# Patient Record
Sex: MALE | Race: WHITE | NOT HISPANIC OR LATINO | URBAN - METROPOLITAN AREA
[De-identification: names, ages, dates, MRNs, and addresses within clinical notes are randomized per-mention and may not be internally consistent; named-entity substitution may affect disease eponyms.]

---

## 2023-03-31 LAB
CHOLESTEROL (MG/DL) IN SER/PLAS: 245 MG/DL (ref 0–199)
CHOLESTEROL IN HDL (MG/DL) IN SER/PLAS: 57.3 MG/DL
CHOLESTEROL/HDL RATIO: 4.3
LDL: 151 MG/DL (ref 0–99)
TRIGLYCERIDE (MG/DL) IN SER/PLAS: 182 MG/DL (ref 0–149)
VLDL: 36 MG/DL (ref 0–40)

## 2023-04-03 NOTE — RESULT ENCOUNTER NOTE
Please let Fabien Stewart know that cholesterol has not changed and is still a candidate for a statin.  We will be reviewed at your follow up scheduled

## 2023-04-10 ENCOUNTER — OFFICE VISIT (OUTPATIENT)
Dept: PRIMARY CARE | Facility: CLINIC | Age: 58
End: 2023-04-10
Payer: COMMERCIAL

## 2023-04-10 VITALS
HEIGHT: 67 IN | DIASTOLIC BLOOD PRESSURE: 72 MMHG | WEIGHT: 170.2 LBS | TEMPERATURE: 98.2 F | BODY MASS INDEX: 26.71 KG/M2 | OXYGEN SATURATION: 98 % | RESPIRATION RATE: 16 BRPM | SYSTOLIC BLOOD PRESSURE: 110 MMHG | HEART RATE: 80 BPM

## 2023-04-10 DIAGNOSIS — E78.1 HYPERTRIGLYCERIDEMIA: ICD-10-CM

## 2023-04-10 DIAGNOSIS — R07.9 CHEST PAIN, UNSPECIFIED TYPE: Primary | ICD-10-CM

## 2023-04-10 DIAGNOSIS — E78.2 MIXED HYPERLIPIDEMIA: ICD-10-CM

## 2023-04-10 DIAGNOSIS — A63.0 CONDYLOMA ACUMINATA: ICD-10-CM

## 2023-04-10 PROBLEM — K21.9 CHEST PAIN DUE TO GERD: Status: ACTIVE | Noted: 2023-04-10

## 2023-04-10 PROBLEM — R07.89 OTHER CHEST PAIN: Status: ACTIVE | Noted: 2023-04-10

## 2023-04-10 PROBLEM — M25.512 PAIN IN LEFT SHOULDER: Status: ACTIVE | Noted: 2023-04-10

## 2023-04-10 PROBLEM — R22.2 ABDOMINAL WALL LUMP: Status: ACTIVE | Noted: 2023-04-10

## 2023-04-10 PROBLEM — R53.83 FATIGUE: Status: ACTIVE | Noted: 2023-04-10

## 2023-04-10 PROBLEM — E55.9 VITAMIN D DEFICIENCY: Status: ACTIVE | Noted: 2023-04-10

## 2023-04-10 PROBLEM — R17 ELEVATED BILIRUBIN: Status: ACTIVE | Noted: 2023-04-10

## 2023-04-10 PROBLEM — D17.1 LIPOMA OF BACK: Status: ACTIVE | Noted: 2023-04-10

## 2023-04-10 PROCEDURE — 93000 ELECTROCARDIOGRAM COMPLETE: CPT | Performed by: INTERNAL MEDICINE

## 2023-04-10 PROCEDURE — 1036F TOBACCO NON-USER: CPT | Performed by: INTERNAL MEDICINE

## 2023-04-10 PROCEDURE — 99214 OFFICE O/P EST MOD 30 MIN: CPT | Performed by: INTERNAL MEDICINE

## 2023-04-10 RX ORDER — ATORVASTATIN CALCIUM 10 MG/1
10 TABLET, FILM COATED ORAL DAILY
Qty: 30 TABLET | Refills: 5 | Status: SHIPPED | OUTPATIENT
Start: 2023-04-10 | End: 2023-11-15 | Stop reason: SDUPTHER

## 2023-04-10 RX ORDER — IMIQUIMOD 12.5 MG/.25G
1 CREAM TOPICAL 3 TIMES WEEKLY
Qty: 12 PACKET | Refills: 2 | Status: SHIPPED | OUTPATIENT
Start: 2023-04-10 | End: 2023-05-10

## 2023-04-10 ASSESSMENT — ENCOUNTER SYMPTOMS
VOMITING: 0
SHORTNESS OF BREATH: 0
CHEST TIGHTNESS: 0
UNEXPECTED WEIGHT CHANGE: 0
POLYDIPSIA: 0
FREQUENCY: 0
FEVER: 0
DIARRHEA: 0
NAUSEA: 0
PALPITATIONS: 0
HEADACHES: 0
CONSTIPATION: 0
COUGH: 0
NECK PAIN: 0
DYSPHORIC MOOD: 0
ARTHRALGIAS: 0
DIZZINESS: 0

## 2023-04-10 ASSESSMENT — PAIN SCALES - GENERAL: PAINLEVEL: 0-NO PAIN

## 2023-04-10 NOTE — PROGRESS NOTES
"Subjective   Fabien Stewart is a 57 y.o. male who presents for Follow-up (Review lab).    Patient has no concerns today     Patient has some skin looked at that is on upper leg- the area is bumpy and a little red.   Has had some lesions in the genital area noted recently.    He experienced an episode of Chest Pain last week while eating chinese food.  Had pressure below the rib cage both sides and it lasted until the next day.    Was doing sit-ups and stopped since then.  Did not go away until the next day after eating breakfast.    No change with food   Exercise walks 6 miles in the morning. No pain or SOB with exercise.              Review of Systems   Constitutional:  Negative for fever and unexpected weight change.   HENT:  Negative for congestion and ear pain.    Eyes:  Negative for visual disturbance.   Respiratory:  Negative for cough, chest tightness and shortness of breath.    Cardiovascular:  Negative for chest pain, palpitations and leg swelling.   Gastrointestinal:  Negative for constipation, diarrhea, nausea and vomiting.   Endocrine: Negative for polydipsia and polyuria.   Genitourinary:  Negative for frequency and urgency.   Musculoskeletal:  Negative for arthralgias and neck pain.   Skin:  Negative for rash.   Neurological:  Negative for dizziness and headaches.   Psychiatric/Behavioral:  Negative for dysphoric mood.    All other systems reviewed and are negative.      Objective   /72   Pulse 80   Temp 36.8 °C (98.2 °F)   Resp 16   Ht 1.702 m (5' 7\")   Wt 77.2 kg (170 lb 3.2 oz)   SpO2 98%   BMI 26.66 kg/m²       Physical Exam  Constitutional:       Appearance: Normal appearance.   HENT:      Head: Normocephalic.   Eyes:      Conjunctiva/sclera: Conjunctivae normal.   Cardiovascular:      Rate and Rhythm: Normal rate and regular rhythm.   Pulmonary:      Effort: Pulmonary effort is normal.      Breath sounds: Normal breath sounds.   Abdominal:      General: Abdomen is flat.      " Palpations: Abdomen is soft.   Genitourinary:     Comments: Areas of flat warts at the base of the penile shaft  Musculoskeletal:      Cervical back: Neck supple.   Lymphadenopathy:      Lower Body: No right inguinal adenopathy. No left inguinal adenopathy.   Skin:     General: Skin is warm and dry.   Neurological:      Mental Status: He is alert.         Assessment/Plan   Problem List Items Addressed This Visit          Infectious/Inflammatory    Condyloma acuminata    Relevant Medications    imiquimod (Aldara) 5 % cream    Other Relevant Orders    Follow Up In [facility] - PCP       Other    Hypertriglyceridemia    Relevant Medications    atorvastatin (Lipitor) 10 mg tablet    Other Relevant Orders    Follow Up In [facility] - PCP    Chest pain, atypical - Primary     Chest pain which is non exertional and along rib cage border.  EKG unremarkable and CT Calcium score was 7.    Will call if pain returns or he develops CP, SOB, BIGGS.           Relevant Medications    atorvastatin (Lipitor) 10 mg tablet     Other Visit Diagnoses       Mixed hyperlipidemia        Relevant Medications    atorvastatin (Lipitor) 10 mg tablet          Encounter Diagnoses   Name Primary?    Chest pain, unspecified type Yes    Condyloma acuminata     Hypertriglyceridemia     Mixed hyperlipidemia      Cameron Alston, DO

## 2023-04-11 DIAGNOSIS — R07.9 CHEST PAIN, UNSPECIFIED TYPE: ICD-10-CM

## 2023-04-20 PROBLEM — M25.512 PAIN IN LEFT SHOULDER: Status: RESOLVED | Noted: 2023-04-10 | Resolved: 2023-04-20

## 2023-04-20 NOTE — ASSESSMENT & PLAN NOTE
Chest pain which is non exertional and along rib cage border.  EKG unremarkable and CT Calcium score was 7.    Will call if pain returns or he develops CP, SOB, BIGGS.

## 2023-05-10 ENCOUNTER — APPOINTMENT (OUTPATIENT)
Dept: PRIMARY CARE | Facility: CLINIC | Age: 58
End: 2023-05-10
Payer: COMMERCIAL

## 2023-05-17 ENCOUNTER — OFFICE VISIT (OUTPATIENT)
Dept: PRIMARY CARE | Facility: CLINIC | Age: 58
End: 2023-05-17
Payer: COMMERCIAL

## 2023-05-17 VITALS
OXYGEN SATURATION: 98 % | BODY MASS INDEX: 27.37 KG/M2 | RESPIRATION RATE: 16 BRPM | WEIGHT: 174.4 LBS | DIASTOLIC BLOOD PRESSURE: 70 MMHG | HEIGHT: 67 IN | TEMPERATURE: 98 F | HEART RATE: 77 BPM | SYSTOLIC BLOOD PRESSURE: 112 MMHG

## 2023-05-17 DIAGNOSIS — R07.9 CHEST PAIN, UNSPECIFIED TYPE: ICD-10-CM

## 2023-05-17 DIAGNOSIS — E78.1 HYPERTRIGLYCERIDEMIA: ICD-10-CM

## 2023-05-17 DIAGNOSIS — A63.0 CONDYLOMA ACUMINATA: ICD-10-CM

## 2023-05-17 DIAGNOSIS — R59.9 REACTIVE LYMPHADENOPATHY: ICD-10-CM

## 2023-05-17 DIAGNOSIS — L03.031 PARONYCHIA OF THIRD TOE OF RIGHT FOOT: Primary | ICD-10-CM

## 2023-05-17 PROCEDURE — 99213 OFFICE O/P EST LOW 20 MIN: CPT | Performed by: INTERNAL MEDICINE

## 2023-05-17 PROCEDURE — 1036F TOBACCO NON-USER: CPT | Performed by: INTERNAL MEDICINE

## 2023-05-17 RX ORDER — CEPHALEXIN 500 MG/1
500 CAPSULE ORAL 3 TIMES DAILY
Qty: 21 CAPSULE | Refills: 0 | Status: SHIPPED | OUTPATIENT
Start: 2023-05-17 | End: 2023-05-24

## 2023-05-17 ASSESSMENT — PAIN SCALES - GENERAL: PAINLEVEL: 0-NO PAIN

## 2023-05-17 NOTE — ASSESSMENT & PLAN NOTE
Advised to cut nails straight across and shorter to lessen trauma while hiking.    History suggests subungual hematoma and likely will lose nail.  Treat infection and follow up in two weeks to recheck lymph nodes.

## 2023-05-17 NOTE — PROGRESS NOTES
"Subjective   Fabien Stewart is a 57 y.o. male who presents for Follow-up and Groin Swelling (Swelling of lymph node on right upper side an infection on right 3rd toe ).      Patient states that he went hiking at end of April, his toe at that time was black and blue then about 5 days ago he noticed right 3rd toe had swelled up, turned red and was painful, he soaked it in Epson salt for the last few days- the pain has went away but still red and swollen   Upper right leg lymph node is swollen x 2-3 days there is pain when pushed on but no pain during walking         Review of Systems   All other systems reviewed and are negative.      Objective   /70   Pulse 77   Temp 36.7 °C (98 °F)   Resp 16   Ht 1.702 m (5' 7\")   Wt 79.1 kg (174 lb 6.4 oz)   SpO2 98%   BMI 27.31 kg/m²       Physical Exam  Constitutional:       General: He is not in acute distress.     Appearance: Normal appearance. He is not toxic-appearing.   Musculoskeletal:      Comments: Two lymph nodes, right inguinal, tender.  Right third toe with mild proximal nail bed erythema, non tender. Small area at base, dried blood and no drainage visible.  Nail bed pale and non adherent to nail bed, consistent with previous hematoma.  No discoloration or ecchymosis at this time.   Lymphadenopathy:      Lower Body: Right inguinal adenopathy present.   Neurological:      Mental Status: He is alert.         Assessment/Plan   Problem List Items Addressed This Visit          Musculoskeletal    Paronychia of third toe of right foot - Primary     Advised to cut nails straight across and shorter to lessen trauma while hiking.    History suggests subungual hematoma and likely will lose nail.  Treat infection and follow up in two weeks to recheck lymph nodes.           Relevant Medications    cephalexin (Keflex) 500 mg capsule       Infectious/Inflammatory    Condyloma acuminata       Immune    Reactive lymphadenopathy     Likely secondary to paronychia on right " lower extremity.  Discussed need to follow up for resolution in two weeks.            Other    Hypertriglyceridemia     Other Visit Diagnoses       Chest pain, unspecified type              Encounter Diagnoses   Name Primary?    Chest pain, unspecified type     Condyloma acuminata     Hypertriglyceridemia     Paronychia of third toe of right foot Yes    Reactive lymphadenopathy      Cameron Alston, DO

## 2023-05-17 NOTE — ASSESSMENT & PLAN NOTE
Likely secondary to paronychia on right lower extremity.  Discussed need to follow up for resolution in two weeks.

## 2023-05-18 ENCOUNTER — APPOINTMENT (OUTPATIENT)
Dept: PRIMARY CARE | Facility: CLINIC | Age: 58
End: 2023-05-18
Payer: COMMERCIAL

## 2023-06-08 ENCOUNTER — APPOINTMENT (OUTPATIENT)
Dept: PRIMARY CARE | Facility: CLINIC | Age: 58
End: 2023-06-08
Payer: COMMERCIAL

## 2023-07-05 ENCOUNTER — OFFICE VISIT (OUTPATIENT)
Dept: PRIMARY CARE | Facility: CLINIC | Age: 58
End: 2023-07-05
Payer: COMMERCIAL

## 2023-07-05 VITALS
DIASTOLIC BLOOD PRESSURE: 78 MMHG | RESPIRATION RATE: 16 BRPM | SYSTOLIC BLOOD PRESSURE: 123 MMHG | HEART RATE: 86 BPM | BODY MASS INDEX: 27.84 KG/M2 | HEIGHT: 67 IN | OXYGEN SATURATION: 96 % | TEMPERATURE: 98 F | WEIGHT: 177.4 LBS

## 2023-07-05 DIAGNOSIS — R59.1 LYMPHADENOPATHY: Primary | ICD-10-CM

## 2023-07-05 DIAGNOSIS — M79.622 LEFT AXILLARY PAIN: ICD-10-CM

## 2023-07-05 DIAGNOSIS — R07.89 OTHER CHEST PAIN: ICD-10-CM

## 2023-07-05 PROCEDURE — 99214 OFFICE O/P EST MOD 30 MIN: CPT | Performed by: INTERNAL MEDICINE

## 2023-07-05 PROCEDURE — 1036F TOBACCO NON-USER: CPT | Performed by: INTERNAL MEDICINE

## 2023-07-05 ASSESSMENT — ENCOUNTER SYMPTOMS: TINGLING: 1

## 2023-07-05 ASSESSMENT — PAIN SCALES - GENERAL: PAINLEVEL: 2

## 2023-07-05 NOTE — PROGRESS NOTES
"Subjective   Fabien Stewart is a 57 y.o. male who presents for Follow-up (Right 3rd toe and and swelling of lymph node in groin area ).      Patient states that his right 3rd toe is a healed and groin lymph node is also better    Patients states that the last 2 weeks he has has a deep pain in his left shoulder blade, then had some tingling that shot up into neck and across chest- went to the ER Monday and they did an EKG, labs and xray and they ruled out blood clots   Feels like a \"Headache in my armpit\"   Noted some pain like when he had fatty tissue removed but he wasn't sure.  Was going to wait then it got worse and across the chest and went to the ER.  Has a vacation planned for tomorrow and wanted to be sure.    Patient can push on the area of pressure/pain but does not make the pain any worse.    Comes and goes.  Thinks it might be his lymph nodes but not sure.      Travelling to [facility]     Shoulder Pain   The pain is present in the neck and left shoulder. This is a recurrent problem. The current episode started more than 1 year ago. There has been no history of extremity trauma. The problem occurs daily. The problem has been unchanged. The quality of the pain is described as aching and dull. The pain is at a severity of 1/10. The pain is mild. Associated symptoms include tingling. He has tried nothing for the symptoms.       Review of Systems   Neurological:  Positive for tingling.   All other systems reviewed and are negative.      Objective   /78   Pulse 86   Temp 36.7 °C (98 °F)   Resp 16   Ht 1.702 m (5' 7\")   Wt 80.5 kg (177 lb 6.4 oz)   SpO2 96%   BMI 27.78 kg/m²       Physical Exam  Constitutional:       Appearance: Normal appearance.   HENT:      Head: Normocephalic.   Eyes:      Conjunctiva/sclera: Conjunctivae normal.   Cardiovascular:      Rate and Rhythm: Normal rate and regular rhythm.   Pulmonary:      Effort: Pulmonary effort is normal.      Breath sounds: Normal breath sounds. "   Musculoskeletal:         General: No swelling. Normal range of motion.      Right upper arm: No swelling, edema, deformity or tenderness.      Left upper arm: No swelling, edema, deformity or tenderness.      Cervical back: Neck supple.   Skin:     General: Skin is warm and dry.   Neurological:      Mental Status: He is alert.         Assessment/Plan   Problem List Items Addressed This Visit       Left axillary pain    Relevant Orders    CT chest wo IV contrast    Other chest pain    Relevant Orders    CT chest wo IV contrast    Lymphadenopathy - Primary    Relevant Orders    CT chest wo IV contrast     Encounter Diagnoses   Name Primary?    Lymphadenopathy Yes    Other chest pain     Left axillary pain      Cameron Alston, DO

## 2023-08-16 ENCOUNTER — OFFICE VISIT (OUTPATIENT)
Dept: PRIMARY CARE | Facility: CLINIC | Age: 58
End: 2023-08-16
Payer: COMMERCIAL

## 2023-08-16 VITALS
OXYGEN SATURATION: 97 % | HEIGHT: 67 IN | RESPIRATION RATE: 16 BRPM | DIASTOLIC BLOOD PRESSURE: 88 MMHG | WEIGHT: 175.8 LBS | HEART RATE: 76 BPM | BODY MASS INDEX: 27.59 KG/M2 | TEMPERATURE: 98.2 F | SYSTOLIC BLOOD PRESSURE: 128 MMHG

## 2023-08-16 DIAGNOSIS — M25.512 CHRONIC LEFT SHOULDER PAIN: ICD-10-CM

## 2023-08-16 DIAGNOSIS — M79.622 LEFT AXILLARY PAIN: Primary | ICD-10-CM

## 2023-08-16 DIAGNOSIS — G89.29 CHRONIC LEFT SHOULDER PAIN: ICD-10-CM

## 2023-08-16 DIAGNOSIS — K76.0 FATTY LIVER: ICD-10-CM

## 2023-08-16 PROCEDURE — 1036F TOBACCO NON-USER: CPT | Performed by: INTERNAL MEDICINE

## 2023-08-16 PROCEDURE — 99213 OFFICE O/P EST LOW 20 MIN: CPT | Performed by: INTERNAL MEDICINE

## 2023-08-16 ASSESSMENT — PAIN SCALES - GENERAL: PAINLEVEL: 1

## 2023-08-16 NOTE — PROGRESS NOTES
"Subjective   Fabien Stewart is a 57 y.o. male who presents for follow up on chest xray and CT  Patient states that the shoulder discomfort has gotten a little better, states that when he talks about it, it will start hurting worse    No new concerns today       Shoulder flared up and went to ER prior to vacation.    Heart was fine.    Pain only bothers him when he thinks about it.          Review of Systems    Objective   /88   Pulse 76   Temp 36.8 °C (98.2 °F)   Resp 16   Ht 1.702 m (5' 7\")   Wt 79.7 kg (175 lb 12.8 oz)   SpO2 97%   BMI 27.53 kg/m²       Physical Exam  Constitutional:       Appearance: Normal appearance.   HENT:      Head: Normocephalic.   Cardiovascular:      Rate and Rhythm: Normal rate.   Pulmonary:      Effort: Pulmonary effort is normal.   Musculoskeletal:      Left shoulder: No swelling or tenderness. Normal range of motion.      Cervical back: Neck supple.      Right lower leg: No edema.      Left lower leg: No edema.   Skin:     General: Skin is warm and dry.   Psychiatric:         Mood and Affect: Mood normal.         Assessment/Plan   Problem List Items Addressed This Visit       Left axillary pain - Primary    Relevant Orders    Referral to Physical Therapy    XR shoulder left 2+ views     Other Visit Diagnoses       Fatty liver        Relevant Orders    US abdomen limited liver    Chronic left shoulder pain        Relevant Orders    Referral to Physical Therapy    XR shoulder left 2+ views          Encounter Diagnoses   Name Primary?    Left axillary pain Yes    Fatty liver     Chronic left shoulder pain      Cameron Alston, DO   "

## 2023-11-15 ENCOUNTER — OFFICE VISIT (OUTPATIENT)
Dept: PRIMARY CARE | Facility: CLINIC | Age: 58
End: 2023-11-15
Payer: COMMERCIAL

## 2023-11-15 VITALS
SYSTOLIC BLOOD PRESSURE: 120 MMHG | HEIGHT: 67 IN | DIASTOLIC BLOOD PRESSURE: 74 MMHG | OXYGEN SATURATION: 98 % | HEART RATE: 76 BPM | RESPIRATION RATE: 16 BRPM | TEMPERATURE: 97.6 F | WEIGHT: 183.8 LBS | BODY MASS INDEX: 28.85 KG/M2

## 2023-11-15 DIAGNOSIS — E78.2 MIXED HYPERLIPIDEMIA: ICD-10-CM

## 2023-11-15 DIAGNOSIS — K21.9 GASTROESOPHAGEAL REFLUX DISEASE WITHOUT ESOPHAGITIS: ICD-10-CM

## 2023-11-15 DIAGNOSIS — Z23 IMMUNIZATION DUE: ICD-10-CM

## 2023-11-15 DIAGNOSIS — E78.1 HYPERTRIGLYCERIDEMIA: ICD-10-CM

## 2023-11-15 DIAGNOSIS — R10.13 EPIGASTRIC PAIN: ICD-10-CM

## 2023-11-15 DIAGNOSIS — K76.0 STEATOSIS OF LIVER: Primary | ICD-10-CM

## 2023-11-15 DIAGNOSIS — R07.9 CHEST PAIN, UNSPECIFIED TYPE: ICD-10-CM

## 2023-11-15 DIAGNOSIS — R17 ELEVATED BILIRUBIN: ICD-10-CM

## 2023-11-15 DIAGNOSIS — R22.2 ABDOMINAL WALL LUMP: ICD-10-CM

## 2023-11-15 PROCEDURE — 1036F TOBACCO NON-USER: CPT | Performed by: INTERNAL MEDICINE

## 2023-11-15 PROCEDURE — 90471 IMMUNIZATION ADMIN: CPT | Performed by: INTERNAL MEDICINE

## 2023-11-15 PROCEDURE — 90750 HZV VACC RECOMBINANT IM: CPT | Performed by: INTERNAL MEDICINE

## 2023-11-15 PROCEDURE — 99215 OFFICE O/P EST HI 40 MIN: CPT | Performed by: INTERNAL MEDICINE

## 2023-11-15 RX ORDER — ATORVASTATIN CALCIUM 10 MG/1
10 TABLET, FILM COATED ORAL DAILY
Qty: 90 TABLET | Refills: 3 | Status: SHIPPED | OUTPATIENT
Start: 2023-11-15 | End: 2024-05-01 | Stop reason: SDUPTHER

## 2023-11-15 RX ORDER — BISMUTH SUBSALICYLATE 262 MG
1 TABLET,CHEWABLE ORAL DAILY
COMMUNITY

## 2023-11-15 RX ORDER — IBUPROFEN 100 MG/5ML
1000 SUSPENSION, ORAL (FINAL DOSE FORM) ORAL DAILY
COMMUNITY

## 2023-11-15 ASSESSMENT — PAIN SCALES - GENERAL: PAINLEVEL: 1

## 2023-11-15 NOTE — PATIENT INSTRUCTIONS
You have symptoms of Gastroesophageal reflux disease, which is when stomach contents move up the esophagus causing heartburn, acid taste, chest pain, and can lead to upper respiratory symptoms, including cough.  You should try to follow these precautions that can help reduce your symptoms.   Try eating small frequent meals and avoiding large amount of food at one setting.    You should also stay upright for at least 2 hours after eating.    Avoid certain foods will also make GERD symptoms worse such as spicy or tomato based foods.    You should avoid smoking, alcohol consumption, or caffeine as all of these may also worsen GERD symptoms.   We may have prescribed a Proton Pump Inhibitor, which helps to reduce the amount of stomach acid and may provide relief.

## 2023-11-15 NOTE — PROGRESS NOTES
"Subjective   Fabien Stewart is a 58 y.o. male who presents for Follow-up (/Ct abdomen/).      Patient states that the left shoulder pain and throbbing is gone, only really gets a ache when lifting his arm     Patient also states that he feels full faster and longer, feels the full feeling in the upper abdomen- says that he can eat at 6pm and still feel full at 1-2am   New problem:  Patient has had a discomfort on the left side that is sensitive and he does feel a lump- states that he thinks its a cyst due to in previous years having a few cyst removed and it feels the same.  Feels full in the morning like he does after does after a full meal.  He eats breakfast and eggs and turkey sausage and has occasional oatmeal with fruit.    Feels he also gained weight through poor eating habits and snacking.    Drinks on the weekend and rarely/social drinking.          Review of Systems   All other systems reviewed and are negative.      Objective   /74   Pulse 76   Temp 36.4 °C (97.6 °F)   Resp 16   Ht 1.702 m (5' 7\")   Wt 83.4 kg (183 lb 12.8 oz)   SpO2 98%   BMI 28.79 kg/m²       Physical Exam  Constitutional:       Appearance: Normal appearance.   HENT:      Head: Normocephalic.   Eyes:      Conjunctiva/sclera: Conjunctivae normal.   Cardiovascular:      Rate and Rhythm: Normal rate and regular rhythm.   Pulmonary:      Effort: Pulmonary effort is normal.      Breath sounds: Normal breath sounds.   Abdominal:      General: Abdomen is flat. There is no distension.      Palpations: Abdomen is soft. There is no mass.      Tenderness: There is no abdominal tenderness. There is no guarding or rebound.      Hernia: No hernia is present.      Comments: Tender at tip of 10th rib     Musculoskeletal:      Cervical back: Neck supple.   Skin:     General: Skin is warm and dry.   Neurological:      Mental Status: He is alert.         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           Elevated bilirubin    Hypertriglyceridemia    Relevant Medications    atorvastatin (Lipitor) 10 mg tablet    Steatosis of liver - Primary    Relevant Orders    US abdomen limited liver    Gastroesophageal reflux disease without esophagitis     Other Visit Diagnoses       Chest pain, unspecified type        Relevant Medications    atorvastatin (Lipitor) 10 mg tablet    Mixed hyperlipidemia        Relevant Medications    atorvastatin (Lipitor) 10 mg tablet    Other Relevant Orders    Lipid Panel    Epigastric pain        Relevant Medications    atorvastatin (Lipitor) 10 mg tablet    Other Relevant Orders    US abdomen limited liver    Hepatic function panel    Basic metabolic panel    Amylase    Lipase    Sedimentation Rate    Immunization due        Relevant Orders    Zoster vaccine, recombinant, adult (SHINGRIX)    Flu vaccine (IIV4) age 6 months and greater, preservative free          Encounter Diagnoses   Name Primary?    Chest pain, unspecified type     Hypertriglyceridemia     Mixed hyperlipidemia     Steatosis of liver Yes    Epigastric pain     Immunization due     Elevated bilirubin     Abdominal wall lump     Gastroesophageal reflux disease without esophagitis      Cameron Alston, DO

## 2024-01-17 ENCOUNTER — APPOINTMENT (OUTPATIENT)
Dept: PRIMARY CARE | Facility: CLINIC | Age: 59
End: 2024-01-17
Payer: COMMERCIAL

## 2024-01-30 ENCOUNTER — APPOINTMENT (OUTPATIENT)
Dept: PRIMARY CARE | Facility: CLINIC | Age: 59
End: 2024-01-30
Payer: COMMERCIAL

## 2024-04-25 ENCOUNTER — LAB (OUTPATIENT)
Dept: LAB | Facility: LAB | Age: 59
End: 2024-04-25
Payer: COMMERCIAL

## 2024-04-25 DIAGNOSIS — R10.13 EPIGASTRIC PAIN: ICD-10-CM

## 2024-04-25 DIAGNOSIS — E78.2 MIXED HYPERLIPIDEMIA: ICD-10-CM

## 2024-04-25 LAB
ALBUMIN SERPL BCP-MCNC: 4.4 G/DL (ref 3.4–5)
ALP SERPL-CCNC: 62 U/L (ref 33–120)
ALT SERPL W P-5'-P-CCNC: 46 U/L (ref 10–52)
AMYLASE SERPL-CCNC: 51 U/L (ref 29–103)
ANION GAP SERPL CALC-SCNC: 12 MMOL/L (ref 10–20)
AST SERPL W P-5'-P-CCNC: 24 U/L (ref 9–39)
BILIRUB DIRECT SERPL-MCNC: 0.3 MG/DL (ref 0–0.3)
BILIRUB SERPL-MCNC: 2 MG/DL (ref 0–1.2)
BUN SERPL-MCNC: 18 MG/DL (ref 6–23)
CALCIUM SERPL-MCNC: 9.5 MG/DL (ref 8.6–10.6)
CHLORIDE SERPL-SCNC: 104 MMOL/L (ref 98–107)
CHOLEST SERPL-MCNC: 235 MG/DL (ref 0–199)
CHOLESTEROL/HDL RATIO: 5.1
CO2 SERPL-SCNC: 28 MMOL/L (ref 21–32)
CREAT SERPL-MCNC: 0.95 MG/DL (ref 0.5–1.3)
EGFRCR SERPLBLD CKD-EPI 2021: >90 ML/MIN/1.73M*2
ERYTHROCYTE [SEDIMENTATION RATE] IN BLOOD BY WESTERGREN METHOD: 2 MM/H (ref 0–20)
GLUCOSE SERPL-MCNC: 124 MG/DL (ref 74–99)
HDLC SERPL-MCNC: 45.7 MG/DL
LDLC SERPL CALC-MCNC: 158 MG/DL
LIPASE SERPL-CCNC: 21 U/L (ref 9–82)
NON HDL CHOLESTEROL: 189 MG/DL (ref 0–149)
POTASSIUM SERPL-SCNC: 4.1 MMOL/L (ref 3.5–5.3)
PROT SERPL-MCNC: 6.9 G/DL (ref 6.4–8.2)
SODIUM SERPL-SCNC: 140 MMOL/L (ref 136–145)
TRIGL SERPL-MCNC: 158 MG/DL (ref 0–149)
VLDL: 32 MG/DL (ref 0–40)

## 2024-04-25 PROCEDURE — 85652 RBC SED RATE AUTOMATED: CPT

## 2024-04-25 PROCEDURE — 80053 COMPREHEN METABOLIC PANEL: CPT

## 2024-04-25 PROCEDURE — 80061 LIPID PANEL: CPT

## 2024-04-25 PROCEDURE — 82150 ASSAY OF AMYLASE: CPT

## 2024-04-25 PROCEDURE — 83690 ASSAY OF LIPASE: CPT

## 2024-04-25 PROCEDURE — 36415 COLL VENOUS BLD VENIPUNCTURE: CPT

## 2024-04-25 PROCEDURE — 82248 BILIRUBIN DIRECT: CPT

## 2024-05-01 ENCOUNTER — OFFICE VISIT (OUTPATIENT)
Dept: PRIMARY CARE | Facility: CLINIC | Age: 59
End: 2024-05-01
Payer: COMMERCIAL

## 2024-05-01 VITALS
SYSTOLIC BLOOD PRESSURE: 116 MMHG | BODY MASS INDEX: 28.63 KG/M2 | HEART RATE: 74 BPM | WEIGHT: 182.4 LBS | RESPIRATION RATE: 16 BRPM | DIASTOLIC BLOOD PRESSURE: 62 MMHG | TEMPERATURE: 97.8 F | OXYGEN SATURATION: 97 % | HEIGHT: 67 IN

## 2024-05-01 DIAGNOSIS — D17.1 LIPOMA OF TORSO: ICD-10-CM

## 2024-05-01 DIAGNOSIS — E78.2 MIXED HYPERLIPIDEMIA: ICD-10-CM

## 2024-05-01 DIAGNOSIS — R17 ELEVATED BILIRUBIN: ICD-10-CM

## 2024-05-01 DIAGNOSIS — Z00.00 WELL ADULT HEALTH CHECK: ICD-10-CM

## 2024-05-01 DIAGNOSIS — K76.0 STEATOSIS OF LIVER: ICD-10-CM

## 2024-05-01 DIAGNOSIS — Z12.5 ENCOUNTER FOR SCREENING FOR MALIGNANT NEOPLASM OF PROSTATE: ICD-10-CM

## 2024-05-01 DIAGNOSIS — E78.5 HYPERLIPIDEMIA LDL GOAL <130: ICD-10-CM

## 2024-05-01 DIAGNOSIS — E78.1 HYPERTRIGLYCERIDEMIA: ICD-10-CM

## 2024-05-01 DIAGNOSIS — R73.01 ELEVATED FASTING GLUCOSE: Primary | ICD-10-CM

## 2024-05-01 DIAGNOSIS — R10.13 EPIGASTRIC PAIN: ICD-10-CM

## 2024-05-01 DIAGNOSIS — R07.9 CHEST PAIN, UNSPECIFIED TYPE: ICD-10-CM

## 2024-05-01 PROCEDURE — 1036F TOBACCO NON-USER: CPT | Performed by: INTERNAL MEDICINE

## 2024-05-01 PROCEDURE — 99396 PREV VISIT EST AGE 40-64: CPT | Performed by: INTERNAL MEDICINE

## 2024-05-01 RX ORDER — ATORVASTATIN CALCIUM 10 MG/1
10 TABLET, FILM COATED ORAL DAILY
Qty: 90 TABLET | Refills: 3 | Status: SHIPPED | OUTPATIENT
Start: 2024-05-01 | End: 2025-05-01

## 2024-05-01 ASSESSMENT — ENCOUNTER SYMPTOMS
NECK PAIN: 0
SHORTNESS OF BREATH: 0
FEVER: 0
CHEST TIGHTNESS: 0
ARTHRALGIAS: 0
HEADACHES: 0
FREQUENCY: 0
PALPITATIONS: 0
VOMITING: 0
DIZZINESS: 0
COUGH: 0
DYSPHORIC MOOD: 0
GASTROINTESTINAL NEGATIVE: 1
NAUSEA: 0
CONSTIPATION: 0
POLYDIPSIA: 0
DIARRHEA: 0
UNEXPECTED WEIGHT CHANGE: 0

## 2024-05-01 ASSESSMENT — PAIN SCALES - GENERAL: PAINLEVEL: 0-NO PAIN

## 2024-05-01 NOTE — PROGRESS NOTES
"Subjective   Fabien Stewart is a 58 y.o. male who presents for Annual Exam.      Well Adult Physical   Patient here for a comprehensive physical exam.The patient reports problems: 2 small bump on left side, with occasionally tenderness with pressure- no discoloration     Do you take any herbs or supplements that were not prescribed by a doctor? yes   Are you taking calcium supplements? yes   Are you taking aspirin daily? no     History:  Patient receives prostate care outside our clinic  Date last PSA: 9.12.2022  Colonoscopy   Patient has not been taking his atorvastatin for a couple months now due to running out             Review of Systems   Constitutional:  Negative for fever and unexpected weight change.   HENT:  Negative for congestion and ear pain.    Eyes:  Negative for visual disturbance.   Respiratory:  Negative for cough, chest tightness and shortness of breath.    Cardiovascular:  Negative for chest pain, palpitations and leg swelling.   Gastrointestinal: Negative.  Negative for constipation, diarrhea, nausea and vomiting.        Denies heartburn since changing his diet.     Endocrine: Negative for polydipsia and polyuria.   Genitourinary:  Negative for frequency and urgency.   Musculoskeletal:  Negative for arthralgias and neck pain.   Skin:  Negative for rash.   Neurological:  Negative for dizziness and headaches.   Psychiatric/Behavioral:  Negative for dysphoric mood.    All other systems reviewed and are negative.      Objective   /62   Pulse 74   Temp 36.6 °C (97.8 °F)   Resp 16   Ht 1.702 m (5' 7\")   Wt 82.7 kg (182 lb 6.4 oz)   SpO2 97%   BMI 28.57 kg/m²       Physical Exam  Constitutional:       Appearance: Normal appearance.   HENT:      Head: Normocephalic.   Eyes:      Conjunctiva/sclera: Conjunctivae normal.   Cardiovascular:      Rate and Rhythm: Normal rate and regular rhythm.      Heart sounds: Normal heart sounds.   Pulmonary:      Effort: Pulmonary effort is normal.      " Breath sounds: Normal breath sounds.   Abdominal:      General: Abdomen is flat.      Palpations: Abdomen is soft.      Comments: Lateral wall tenderness with lipoma.    Musculoskeletal:      Cervical back: Neck supple.   Skin:     General: Skin is warm and dry.   Neurological:      Mental Status: He is alert.         Assessment/Plan   Problem List Items Addressed This Visit       Hypertriglyceridemia    Relevant Medications    atorvastatin (Lipitor) 10 mg tablet    Steatosis of liver    Relevant Orders    US abdomen limited liver     Other Visit Diagnoses       Elevated fasting glucose    -  Primary    Relevant Orders    Hemoglobin A1c    Chest pain, unspecified type        Relevant Medications    atorvastatin (Lipitor) 10 mg tablet    Mixed hyperlipidemia        Relevant Medications    atorvastatin (Lipitor) 10 mg tablet    Other Relevant Orders    Lipid Panel    Epigastric pain        Relevant Medications    atorvastatin (Lipitor) 10 mg tablet    Encounter for screening for malignant neoplasm of prostate        Relevant Orders    Prostate Specific Antigen    Well adult health check        Relevant Medications    atorvastatin (Lipitor) 10 mg tablet    Other Relevant Orders    US abdomen limited liver    Lipid Panel    Prostate Specific Antigen    Hemoglobin A1c    Hepatitis C antibody    HIV 1/2 Antigen/Antibody Screen with Reflex to Confirmation          Encounter Diagnoses   Name Primary?    Elevated fasting glucose Yes    Steatosis of liver     Chest pain, unspecified type     Hypertriglyceridemia     Mixed hyperlipidemia     Epigastric pain     Encounter for screening for malignant neoplasm of prostate     Well adult health check      Cameron Alston, DO

## 2024-05-01 NOTE — ASSESSMENT & PLAN NOTE
Has been off statins he had run out of the medication in January.  Will resume and repeat lipid panel in 2 to 3 months.

## 2024-09-06 ENCOUNTER — APPOINTMENT (OUTPATIENT)
Dept: RADIOLOGY | Facility: CLINIC | Age: 59
End: 2024-09-06
Payer: COMMERCIAL

## 2024-09-12 ENCOUNTER — APPOINTMENT (OUTPATIENT)
Dept: PRIMARY CARE | Facility: CLINIC | Age: 59
End: 2024-09-12
Payer: COMMERCIAL

## 2025-01-03 ENCOUNTER — OFFICE VISIT (OUTPATIENT)
Dept: URGENT CARE | Age: 60
End: 2025-01-03
Payer: COMMERCIAL

## 2025-01-03 VITALS
RESPIRATION RATE: 18 BRPM | HEIGHT: 67 IN | DIASTOLIC BLOOD PRESSURE: 86 MMHG | TEMPERATURE: 97.7 F | SYSTOLIC BLOOD PRESSURE: 123 MMHG | BODY MASS INDEX: 27.47 KG/M2 | OXYGEN SATURATION: 98 % | HEART RATE: 74 BPM | WEIGHT: 175 LBS

## 2025-01-03 DIAGNOSIS — J01.00 ACUTE NON-RECURRENT MAXILLARY SINUSITIS: Primary | ICD-10-CM

## 2025-01-03 RX ORDER — DOXYCYCLINE 100 MG/1
100 TABLET ORAL 2 TIMES DAILY
Qty: 20 TABLET | Refills: 0 | Status: SHIPPED | OUTPATIENT
Start: 2025-01-03 | End: 2025-01-13

## 2025-01-03 ASSESSMENT — PATIENT HEALTH QUESTIONNAIRE - PHQ9
SUM OF ALL RESPONSES TO PHQ9 QUESTIONS 1 & 2: 0
2. FEELING DOWN, DEPRESSED OR HOPELESS: NOT AT ALL
1. LITTLE INTEREST OR PLEASURE IN DOING THINGS: NOT AT ALL

## 2025-01-03 ASSESSMENT — ENCOUNTER SYMPTOMS: COUGH: 1

## 2025-01-03 NOTE — PATIENT INSTRUCTIONS
Antibiotics as directed; take with food  Decongestants, nasal saline as needed  Follow up with your Primary Care Provider

## 2025-01-03 NOTE — PROGRESS NOTES
"Subjective   Patient ID: Fabien Stewart is a 59 y.o. male. He presents today with a chief complaint of Cough (Dry productive cough /Ongoing 1 week ).    History of Present Illness  Subjective  Fabien Stewart is a 59 y.o. male who presents for evaluation of symptoms of a URI. Symptoms include a tickle feeling in his throat, cough described as nonproductive, nasal congestion, and post nasal drip. Onset of symptoms was 8 days ago and has been unchanged since that time. Treatment to date: Mucinex, which causes him to cough          Cough        Past Medical History  Allergies as of 01/03/2025    (No Known Allergies)       (Not in a hospital admission)       Past Medical History:   Diagnosis Date    Hyperlipidemia        Past Surgical History:   Procedure Laterality Date    ANKLE SURGERY      HAND SURGERY          reports that he has never smoked. He has never used smokeless tobacco. He reports current alcohol use of about 1.0 standard drink of alcohol per week. He reports that he does not use drugs.    Review of Systems  Review of Systems   Respiratory:  Positive for cough.                                   Objective    Vitals:    01/03/25 1029   BP: 123/86   BP Location: Left arm   Patient Position: Sitting   Pulse: 74   Resp: 18   Temp: 36.5 °C (97.7 °F)   SpO2: 94%   Weight: 79.4 kg (175 lb)   Height: 1.702 m (5' 7\")     No LMP for male patient.    Physical Exam  Constitutional:       General: He is not in acute distress.     Appearance: Normal appearance. He is not ill-appearing or toxic-appearing.   HENT:      Right Ear: Tympanic membrane, ear canal and external ear normal.      Left Ear: Tympanic membrane, ear canal and external ear normal.      Nose: Congestion and rhinorrhea present.      Mouth/Throat:      Mouth: Mucous membranes are moist.      Comments: Postnasal drainage  Eyes:      Extraocular Movements: Extraocular movements intact.      Pupils: Pupils are equal, round, and reactive to light. "   Cardiovascular:      Rate and Rhythm: Normal rate and regular rhythm.      Pulses: Normal pulses.      Heart sounds: Normal heart sounds.   Pulmonary:      Effort: Pulmonary effort is normal.      Breath sounds: Normal breath sounds. No wheezing, rhonchi or rales.   Musculoskeletal:      Cervical back: Normal range of motion and neck supple. No rigidity or tenderness.   Lymphadenopathy:      Cervical: No cervical adenopathy.   Neurological:      Mental Status: He is alert.         Procedures    Point of Care Test & Imaging Results from this visit  No results found for this visit on 01/03/25.   No results found.    Diagnostic study results (if any) were reviewed by Georgina Olvera MD.    Assessment/Plan   Allergies, medications, history, and pertinent labs/EKGs/Imaging reviewed by Georgina Olvera MD.     Medical Decision Making  Patient is concerned about possibility of walking pneumonia. Lung sounds are normal on exam; offered CXR, patient declined.    Orders and Diagnoses  There are no diagnoses linked to this encounter.    Medical Admin Record      Patient disposition: Home    Electronically signed by Georgina Olvera MD  10:51 AM